# Patient Record
Sex: MALE | Race: WHITE | URBAN - METROPOLITAN AREA
[De-identification: names, ages, dates, MRNs, and addresses within clinical notes are randomized per-mention and may not be internally consistent; named-entity substitution may affect disease eponyms.]

---

## 2021-01-12 ENCOUNTER — OFFICE VISIT (OUTPATIENT)
Dept: URBAN - METROPOLITAN AREA CLINIC 98 | Facility: CLINIC | Age: 61
End: 2021-01-12
Payer: COMMERCIAL

## 2021-01-12 VITALS
BODY MASS INDEX: 30.16 KG/M2 | HEIGHT: 68 IN | TEMPERATURE: 97.4 F | SYSTOLIC BLOOD PRESSURE: 138 MMHG | DIASTOLIC BLOOD PRESSURE: 82 MMHG | HEART RATE: 53 BPM | WEIGHT: 199 LBS

## 2021-01-12 DIAGNOSIS — D72.829 LEUKOCYTOSIS: ICD-10-CM

## 2021-01-12 DIAGNOSIS — K21.9 GERD: ICD-10-CM

## 2021-01-12 DIAGNOSIS — D64.9 ANEMIA: ICD-10-CM

## 2021-01-12 DIAGNOSIS — K63.5 COLON POLYPS: ICD-10-CM

## 2021-01-12 PROCEDURE — G8417 CALC BMI ABV UP PARAM F/U: HCPCS | Performed by: INTERNAL MEDICINE

## 2021-01-12 PROCEDURE — 99214 OFFICE O/P EST MOD 30 MIN: CPT | Performed by: INTERNAL MEDICINE

## 2021-01-12 PROCEDURE — G8427 DOCREV CUR MEDS BY ELIG CLIN: HCPCS | Performed by: INTERNAL MEDICINE

## 2021-01-12 PROCEDURE — 3017F COLORECTAL CA SCREEN DOC REV: CPT | Performed by: INTERNAL MEDICINE

## 2021-01-12 PROCEDURE — G8482 FLU IMMUNIZE ORDER/ADMIN: HCPCS | Performed by: INTERNAL MEDICINE

## 2021-01-12 PROCEDURE — 1036F TOBACCO NON-USER: CPT | Performed by: INTERNAL MEDICINE

## 2021-01-12 PROCEDURE — G9903 PT SCRN TBCO ID AS NON USER: HCPCS | Performed by: INTERNAL MEDICINE

## 2021-01-12 RX ORDER — PANTOPRAZOLE SODIUM 40 MG/1
1 TABLET TABLET, DELAYED RELEASE ORAL
Status: ACTIVE | COMMUNITY

## 2021-01-12 RX ORDER — ROSUVASTATIN CALCIUM 40 MG/1
1 TABLET TABLET, FILM COATED ORAL
Status: ACTIVE | COMMUNITY

## 2021-01-12 RX ORDER — ALPRAZOLAM 0.5 MG/1
AS DIRECTED TABLET ORAL
Status: ACTIVE | COMMUNITY

## 2021-01-12 RX ORDER — BISOPROLOL FUMARATE 10 MG/1
1 TABLET TABLET, FILM COATED ORAL
Refills: 0 | Status: ACTIVE | COMMUNITY
Start: 1900-01-01

## 2021-01-12 RX ORDER — ESCITALOPRAM 20 MG/1
1 TABLET TABLET, FILM COATED ORAL
Refills: 0 | Status: ACTIVE | COMMUNITY
Start: 1900-01-01

## 2021-01-12 RX ORDER — VITAMIN E MIXED 400 UNIT
AS DIRECTED CAPSULE ORAL
Status: ACTIVE | COMMUNITY

## 2021-01-12 NOTE — HPI-TODAY'S VISIT:
Patient referred by Dr. Luke for evaluation of anemia.  A copy of this report will be sent to referring provider. Recent labs with PCP revealed mild macrocytic anemia.   MCV>100.  On review of my old labs, his ferritin was >1000  12/2020.  He does endorese h/o drinking 2 bottles of wine a day.  Does have h/o mild AST/ALT elevaiton with normal LFTs 12/2019.  MRI in the past with heterogenous liver.  Recent MRI done heterogenous liver without nodular contour to suggest fibrosis.   He saw Dr. Dale recently for evaluation, but records are not currently available (I do have Dr. Dale's clinic note that reports Plts >300).  No change in bowel habits.   We reviewed EGD/Colonoscopy reports from 2019 without significant pathology.  . PRIOR VISIT: Rectal pain reported last visit seems to have resolved with compound ointment Has not started fiber supplement No change in bowel habits Continues driking 2-3 glasses of wine a day PRIOR VISIT: At last visit, we discussed f/u hemorrhoids Reports he has tried to increase H20, but reports he is also drinking gatorade as well. Did not start fiber supplement. Bleeding has improved. Now reports significant rectal pain with bowel movements. 3-4 BM/day. No straining. Recent MRI reviewed. Heterogenous liver noted. Mild AST/ALT elevation on labs Still having 2-3 drinks/day  . PRIOR VISIT: H/o GERD, which is well controlled on pantoprazole He has a h/o bleeding hemorrhoids. Last colonoscopy 2019 with TA, rec repeat 3yrs. He sees blood at least 2x/week with bowel movements Reports he has 2-3 BM/day. BM usually soft. No significant straining. Does sit on the toilet for 5-8min for BMs Does not drink enough water during the day Does not get enough fiber in his diet Has been on Canasa in the past with mild benefit Has had hemorrhoid banding about 3 years ago Per report he had an enlarged liver on a recent ultrasound He thinks some of his liver enzymes were abnormal as well, but not sure what numbers are He drinks a "couple glasses" of wine every day. Denies other EtOH intake No known h/o liver disease  . PRIOR VISIT: GERD with stable symtpoms on Pantoprazole but was better on Dexilant. Canasa helps somewhat. Still bleeding. Wamnts some donnatal Reports abdominal; apin, hemorrhoids, bloating, change in bowel habits, diarrhea, indigestion, mucous in the stool and rectal bleeding. His last EGD 7/14/16 was without Vizcarra's or ulcers. Last E/C 7/11/14 was with RE and Adenoma. Right nipple pain and fullness. Has had night sweats. We discussed this for 45 minutes. 50% of time was spent coordinantg care.

## 2021-02-10 ENCOUNTER — ERX REFILL RESPONSE (OUTPATIENT)
Age: 61
End: 2021-02-10

## 2021-02-10 RX ORDER — PANTOPRAZOLE SODIUM 40 MG/1
TAKE ONE TABLET BY MOUTH ONE TIME DAILY TABLET, DELAYED RELEASE ORAL
Qty: 90 | Refills: 2

## 2021-02-15 ENCOUNTER — TELEPHONE ENCOUNTER (OUTPATIENT)
Dept: URBAN - METROPOLITAN AREA CLINIC 98 | Facility: CLINIC | Age: 61
End: 2021-02-15

## 2021-02-15 RX ORDER — PANTOPRAZOLE SODIUM 40 MG/1
1 TABLET TABLET, DELAYED RELEASE ORAL ONCE A DAY
Qty: 90 | Refills: 3

## 2021-04-13 ENCOUNTER — OFFICE VISIT (OUTPATIENT)
Dept: URBAN - METROPOLITAN AREA CLINIC 98 | Facility: CLINIC | Age: 61
End: 2021-04-13

## 2021-04-13 RX ORDER — ESCITALOPRAM 20 MG/1
1 TABLET TABLET, FILM COATED ORAL
Refills: 0 | Status: ACTIVE | COMMUNITY
Start: 1900-01-01

## 2021-04-13 RX ORDER — PANTOPRAZOLE SODIUM 40 MG/1
TAKE ONE TABLET BY MOUTH ONE TIME DAILY TABLET, DELAYED RELEASE ORAL
Qty: 90 | Refills: 2 | Status: ACTIVE | COMMUNITY

## 2021-04-13 RX ORDER — VITAMIN E MIXED 400 UNIT
AS DIRECTED CAPSULE ORAL
Status: ACTIVE | COMMUNITY

## 2021-04-13 RX ORDER — PANTOPRAZOLE SODIUM 40 MG/1
1 TABLET TABLET, DELAYED RELEASE ORAL ONCE A DAY
Qty: 90 | Refills: 3 | Status: ACTIVE | COMMUNITY

## 2021-04-13 RX ORDER — ALPRAZOLAM 0.5 MG/1
AS DIRECTED TABLET ORAL
Status: ACTIVE | COMMUNITY

## 2021-04-13 RX ORDER — ROSUVASTATIN CALCIUM 40 MG/1
1 TABLET TABLET, FILM COATED ORAL
Status: ACTIVE | COMMUNITY

## 2021-04-13 RX ORDER — BISOPROLOL FUMARATE 10 MG/1
1 TABLET TABLET, FILM COATED ORAL
Refills: 0 | Status: ACTIVE | COMMUNITY
Start: 1900-01-01

## 2021-11-08 ENCOUNTER — TELEPHONE ENCOUNTER (OUTPATIENT)
Dept: URBAN - METROPOLITAN AREA CLINIC 98 | Facility: CLINIC | Age: 61
End: 2021-11-08

## 2021-11-08 RX ORDER — PANTOPRAZOLE SODIUM 40 MG/1
TAKE ONE TABLET BY MOUTH ONE TIME DAILY TABLET, DELAYED RELEASE ORAL
Qty: 30 | Refills: 3

## 2022-01-25 ENCOUNTER — P2P PATIENT RECORD (OUTPATIENT)
Age: 62
End: 2022-01-25

## 2022-06-08 ENCOUNTER — OFFICE VISIT (OUTPATIENT)
Dept: URBAN - METROPOLITAN AREA CLINIC 105 | Facility: CLINIC | Age: 62
End: 2022-06-08

## 2022-06-08 RX ORDER — BISOPROLOL FUMARATE 10 MG/1
1 TABLET TABLET, FILM COATED ORAL
Refills: 0 | Status: ACTIVE | COMMUNITY
Start: 1900-01-01

## 2022-06-08 RX ORDER — VITAMIN E MIXED 400 UNIT
AS DIRECTED CAPSULE ORAL
Status: ACTIVE | COMMUNITY

## 2022-06-08 RX ORDER — ESCITALOPRAM 20 MG/1
1 TABLET TABLET, FILM COATED ORAL
Refills: 0 | Status: ACTIVE | COMMUNITY
Start: 1900-01-01

## 2022-06-08 RX ORDER — PANTOPRAZOLE SODIUM 40 MG/1
TAKE ONE TABLET BY MOUTH ONE TIME DAILY TABLET, DELAYED RELEASE ORAL
Qty: 30 | Refills: 3 | Status: ACTIVE | COMMUNITY

## 2022-06-08 RX ORDER — PANTOPRAZOLE SODIUM 40 MG/1
1 TABLET TABLET, DELAYED RELEASE ORAL ONCE A DAY
Qty: 90 | Refills: 3 | Status: ACTIVE | COMMUNITY

## 2022-06-08 RX ORDER — ROSUVASTATIN CALCIUM 40 MG/1
1 TABLET TABLET, FILM COATED ORAL
Status: ACTIVE | COMMUNITY

## 2022-06-08 RX ORDER — ALPRAZOLAM 0.5 MG/1
AS DIRECTED TABLET ORAL
Status: ACTIVE | COMMUNITY

## 2022-11-04 ENCOUNTER — P2P PATIENT RECORD (OUTPATIENT)
Age: 62
End: 2022-11-04

## 2023-01-17 ENCOUNTER — OFFICE VISIT (OUTPATIENT)
Dept: URBAN - METROPOLITAN AREA CLINIC 98 | Facility: CLINIC | Age: 63
End: 2023-01-17

## 2023-01-26 ENCOUNTER — OFFICE VISIT (OUTPATIENT)
Dept: URBAN - METROPOLITAN AREA CLINIC 50 | Facility: CLINIC | Age: 63
End: 2023-01-26

## 2023-02-08 ENCOUNTER — TELEPHONE ENCOUNTER (OUTPATIENT)
Dept: URBAN - METROPOLITAN AREA CLINIC 98 | Facility: CLINIC | Age: 63
End: 2023-02-08

## 2023-02-09 ENCOUNTER — WEB ENCOUNTER (OUTPATIENT)
Dept: URBAN - METROPOLITAN AREA CLINIC 98 | Facility: CLINIC | Age: 63
End: 2023-02-09

## 2023-02-10 ENCOUNTER — LAB OUTSIDE AN ENCOUNTER (OUTPATIENT)
Dept: URBAN - METROPOLITAN AREA CLINIC 98 | Facility: CLINIC | Age: 63
End: 2023-02-10

## 2023-02-10 ENCOUNTER — OFFICE VISIT (OUTPATIENT)
Dept: URBAN - METROPOLITAN AREA CLINIC 98 | Facility: CLINIC | Age: 63
End: 2023-02-10
Payer: COMMERCIAL

## 2023-02-10 VITALS
BODY MASS INDEX: 30.89 KG/M2 | WEIGHT: 203.8 LBS | HEIGHT: 68 IN | TEMPERATURE: 97.2 F | DIASTOLIC BLOOD PRESSURE: 60 MMHG | HEART RATE: 60 BPM | SYSTOLIC BLOOD PRESSURE: 105 MMHG

## 2023-02-10 DIAGNOSIS — R13.19 ESOPHAGEAL DYSPHAGIA: ICD-10-CM

## 2023-02-10 DIAGNOSIS — D64.9 ANEMIA: ICD-10-CM

## 2023-02-10 DIAGNOSIS — K63.5 COLON POLYPS: ICD-10-CM

## 2023-02-10 DIAGNOSIS — K21.9 GERD: ICD-10-CM

## 2023-02-10 DIAGNOSIS — R16.0 HEPATOMEGALY: ICD-10-CM

## 2023-02-10 PROBLEM — 390943009 SERUM FERRITIN HIGH: Status: ACTIVE | Noted: 2021-01-12

## 2023-02-10 PROBLEM — 166318006 BLOOD CHEMISTRY ABNORMAL: Status: ACTIVE | Noted: 2021-01-12

## 2023-02-10 PROBLEM — 29738008 PROTEINURIA: Status: ACTIVE | Noted: 2021-01-12

## 2023-02-10 PROBLEM — 197321007 STEATOSIS OF LIVER: Status: ACTIVE | Noted: 2021-01-12

## 2023-02-10 PROBLEM — 105502003: Status: ACTIVE | Noted: 2023-02-10

## 2023-02-10 PROBLEM — 111583006 LEUKOCYTOSIS: Status: ACTIVE | Noted: 2021-01-12

## 2023-02-10 PROBLEM — 236435004: Status: ACTIVE | Noted: 2023-02-10

## 2023-02-10 PROBLEM — 15167005 ETHANOL ABUSE: Status: ACTIVE | Noted: 2021-01-12

## 2023-02-10 PROCEDURE — 99214 OFFICE O/P EST MOD 30 MIN: CPT | Performed by: INTERNAL MEDICINE

## 2023-02-10 RX ORDER — BISOPROLOL FUMARATE 10 MG/1
1 TABLET TABLET, FILM COATED ORAL
Refills: 0 | Status: ACTIVE | COMMUNITY
Start: 1900-01-01

## 2023-02-10 RX ORDER — ROSUVASTATIN CALCIUM 40 MG/1
1 TABLET TABLET, FILM COATED ORAL
Status: ACTIVE | COMMUNITY

## 2023-02-10 RX ORDER — VITAMIN E MIXED 400 UNIT
AS DIRECTED CAPSULE ORAL
Status: ACTIVE | COMMUNITY

## 2023-02-10 RX ORDER — ALPRAZOLAM 0.5 MG/1
AS DIRECTED TABLET ORAL
Status: ON HOLD | COMMUNITY

## 2023-02-10 RX ORDER — PANTOPRAZOLE SODIUM 40 MG/1
TAKE ONE TABLET BY MOUTH ONE TIME DAILY TABLET, DELAYED RELEASE ORAL ONCE A DAY
Qty: 90 | Refills: 3

## 2023-02-10 RX ORDER — POLYETHYLENE GLYCOL-3350 AND ELECTROLYTES WITH FLAVOR PACK 240; 5.84; 2.98; 6.72; 22.72 G/278.26G; G/278.26G; G/278.26G; G/278.26G; G/278.26G
AS DIRECTED POWDER, FOR SOLUTION ORAL ONCE
Qty: 1 KIT | Refills: 0 | OUTPATIENT
Start: 2023-02-10 | End: 2023-02-11

## 2023-02-10 RX ORDER — PANTOPRAZOLE SODIUM 40 MG/1
TAKE ONE TABLET BY MOUTH ONE TIME DAILY TABLET, DELAYED RELEASE ORAL
Qty: 30 | Refills: 3 | Status: ON HOLD | COMMUNITY

## 2023-02-10 RX ORDER — PANTOPRAZOLE SODIUM 40 MG/1
1 TABLET TABLET, DELAYED RELEASE ORAL ONCE A DAY
Qty: 90 | Refills: 3 | Status: ON HOLD | COMMUNITY

## 2023-02-10 RX ORDER — ESCITALOPRAM 20 MG/1
1 TABLET TABLET, FILM COATED ORAL
Refills: 0 | Status: ACTIVE | COMMUNITY
Start: 1900-01-01

## 2023-02-10 NOTE — HPI-TODAY'S VISIT:
f/u visit Recently hospitalized at [facility] for initiation of dialysis In clinic today for months of epigastric abd discomfort after eating He will have sharp epigastric pain after eating or drinking.  Intermittent symptoms Will feel like food gets stuck from time to time     HOSPITAL CONSULT 1/30/23 ROCKY GROVES is a 62 y.o. male with a PMH of CKD, anxiety, depression, and alcohol use who presented on 1/29 with general malaise, poor oral intake, and reported fall at home, found to have electrolyte abnormalities, JO, elevated liver enzymes, and rhabdomyolysis, admitted to the ICU for further management. GI is consulted on 1/30 for dark stools.  . PRIOR VISIT: Patient referred by Dr. Luke for evaluation of anemia.  A copy of this report will be sent to referring provider. Recent labs with PCP revealed mild macrocytic anemia.   MCV>100.  On review of my old labs, his ferritin was >1000  12/2020.  He does endorese h/o drinking 2 bottles of wine a day.  Does have h/o mild AST/ALT elevaiton with normal LFTs 12/2019.  MRI in the past with heterogenous liver.  Recent MRI done heterogenous liver without nodular contour to suggest fibrosis.   He saw Dr. Dale recently for evaluation, but records are not currently available (I do have Dr. Dale's clinic note that reports Plts >300).  No change in bowel habits.   We reviewed EGD/Colonoscopy reports from 2019 without significant pathology.  . PRIOR VISIT: Rectal pain reported last visit seems to have resolved with compound ointment Has not started fiber supplement No change in bowel habits Continues driking 2-3 glasses of wine a day PRIOR VISIT: At last visit, we discussed f/u hemorrhoids Reports he has tried to increase H20, but reports he is also drinking gatorade as well. Did not start fiber supplement. Bleeding has improved. Now reports significant rectal pain with bowel movements. 3-4 BM/day. No straining. Recent MRI reviewed. Heterogenous liver noted. Mild AST/ALT elevation on labs Still having 2-3 drinks/day  . PRIOR VISIT: H/o GERD, which is well controlled on pantoprazole He has a h/o bleeding hemorrhoids. Last colonoscopy 2019 with TA, rec repeat 3yrs. He sees blood at least 2x/week with bowel movements Reports he has 2-3 BM/day. BM usually soft. No significant straining. Does sit on the toilet for 5-8min for BMs Does not drink enough water during the day Does not get enough fiber in his diet Has been on Canasa in the past with mild benefit Has had hemorrhoid banding about 3 years ago Per report he had an enlarged liver on a recent ultrasound He thinks some of his liver enzymes were abnormal as well, but not sure what numbers are He drinks a "couple glasses" of wine every day. Denies other EtOH intake No known h/o liver disease  . PRIOR VISIT: GERD with stable symtpoms on Pantoprazole but was better on Dexilant. Canasa helps somewhat. Still bleeding. Wamnts some donnatal Reports abdominal; apin, hemorrhoids, bloating, change in bowel habits, diarrhea, indigestion, mucous in the stool and rectal bleeding. His last EGD 7/14/16 was without Vizcarra's or ulcers. Last E/C 7/11/14 was with RE and Adenoma. Right nipple pain and fullness. Has had night sweats. We discussed this for 45 minutes. 50% of time was spent coordinantg care.

## 2023-02-16 ENCOUNTER — OFFICE VISIT (OUTPATIENT)
Dept: URBAN - METROPOLITAN AREA MEDICAL CENTER 39 | Facility: MEDICAL CENTER | Age: 63
End: 2023-02-16

## 2023-02-16 ENCOUNTER — CLAIMS CREATED FROM THE CLAIM WINDOW (OUTPATIENT)
Dept: URBAN - METROPOLITAN AREA MEDICAL CENTER 39 | Facility: MEDICAL CENTER | Age: 63
End: 2023-02-16
Payer: COMMERCIAL

## 2023-02-16 DIAGNOSIS — K22.2 ACQUIRED ESOPHAGEAL RING: ICD-10-CM

## 2023-02-16 DIAGNOSIS — K31.89 ACQUIRED DEFORMITY OF DUODENUM: ICD-10-CM

## 2023-02-16 DIAGNOSIS — D12.4 ADENOMA OF DESCENDING COLON: ICD-10-CM

## 2023-02-16 DIAGNOSIS — Z86.010 ADENOMAS PERSONAL HISTORY OF COLONIC POLYPS: ICD-10-CM

## 2023-02-16 PROCEDURE — 43239 EGD BIOPSY SINGLE/MULTIPLE: CPT | Performed by: INTERNAL MEDICINE

## 2023-02-16 PROCEDURE — 45385 COLONOSCOPY W/LESION REMOVAL: CPT | Performed by: INTERNAL MEDICINE

## 2023-02-16 PROCEDURE — 43249 ESOPH EGD DILATION <30 MM: CPT | Performed by: INTERNAL MEDICINE

## 2023-02-16 RX ORDER — VITAMIN E MIXED 400 UNIT
AS DIRECTED CAPSULE ORAL
Status: ACTIVE | COMMUNITY

## 2023-02-16 RX ORDER — ROSUVASTATIN CALCIUM 40 MG/1
1 TABLET TABLET, FILM COATED ORAL
Status: ACTIVE | COMMUNITY

## 2023-02-16 RX ORDER — PANTOPRAZOLE SODIUM 40 MG/1
1 TABLET TABLET, DELAYED RELEASE ORAL ONCE A DAY
Qty: 90 | Refills: 3 | Status: ON HOLD | COMMUNITY

## 2023-02-16 RX ORDER — BISOPROLOL FUMARATE 10 MG/1
1 TABLET TABLET, FILM COATED ORAL
Refills: 0 | Status: ACTIVE | COMMUNITY
Start: 1900-01-01

## 2023-02-16 RX ORDER — PANTOPRAZOLE SODIUM 40 MG/1
TAKE ONE TABLET BY MOUTH ONE TIME DAILY TABLET, DELAYED RELEASE ORAL ONCE A DAY
Qty: 90 | Refills: 3 | Status: ACTIVE | COMMUNITY

## 2023-02-16 RX ORDER — ESCITALOPRAM 20 MG/1
1 TABLET TABLET, FILM COATED ORAL
Refills: 0 | Status: ACTIVE | COMMUNITY
Start: 1900-01-01

## 2023-02-16 RX ORDER — ALPRAZOLAM 0.5 MG/1
AS DIRECTED TABLET ORAL
Status: ON HOLD | COMMUNITY

## 2023-02-17 ENCOUNTER — OUT OF OFFICE VISIT (OUTPATIENT)
Dept: URBAN - METROPOLITAN AREA MEDICAL CENTER 39 | Facility: MEDICAL CENTER | Age: 63
End: 2023-02-17
Payer: COMMERCIAL

## 2023-02-17 DIAGNOSIS — K62.5 BLOOD PER RECTUM: ICD-10-CM

## 2023-02-17 DIAGNOSIS — Z86.010 COLON POLYP HISTORY: ICD-10-CM

## 2023-02-17 DIAGNOSIS — K91.840 POSTOPERATIVE HEMORRHAGE INVOLVING DIGESTIVE SYSTEM FOLLOWING DIGESTIVE SYSTEM PROCEDURE: ICD-10-CM

## 2023-02-17 DIAGNOSIS — N18.30 CHRONIC KIDNEY DISEASE, STAGE 3: ICD-10-CM

## 2023-02-17 PROCEDURE — G8427 DOCREV CUR MEDS BY ELIG CLIN: HCPCS | Performed by: INTERNAL MEDICINE

## 2023-02-17 PROCEDURE — 99255 IP/OBS CONSLTJ NEW/EST HI 80: CPT | Performed by: INTERNAL MEDICINE

## 2023-02-18 ENCOUNTER — OUT OF OFFICE VISIT (OUTPATIENT)
Dept: URBAN - METROPOLITAN AREA MEDICAL CENTER 39 | Facility: MEDICAL CENTER | Age: 63
End: 2023-02-18
Payer: COMMERCIAL

## 2023-02-18 DIAGNOSIS — K63.3 COLON ULCER: ICD-10-CM

## 2023-02-18 DIAGNOSIS — D64.89 ANEMIA DUE TO OTHER CAUSE: ICD-10-CM

## 2023-02-18 DIAGNOSIS — D12.2 ADENOMA OF ASCENDING COLON: ICD-10-CM

## 2023-02-18 PROCEDURE — 99233 SBSQ HOSP IP/OBS HIGH 50: CPT | Performed by: INTERNAL MEDICINE

## 2023-02-18 PROCEDURE — 45380 COLONOSCOPY AND BIOPSY: CPT | Performed by: INTERNAL MEDICINE

## 2023-02-20 PROBLEM — 80515008 HEPATOMEGALY: Status: ACTIVE | Noted: 2021-01-12

## 2023-02-20 PROBLEM — 271737000 ANEMIA: Status: ACTIVE | Noted: 2021-01-12

## 2023-02-20 PROBLEM — 235595009 GASTROESOPHAGEAL REFLUX DISEASE: Status: ACTIVE | Noted: 2023-02-10

## 2023-03-03 ENCOUNTER — TELEPHONE ENCOUNTER (OUTPATIENT)
Dept: URBAN - METROPOLITAN AREA CLINIC 98 | Facility: CLINIC | Age: 63
End: 2023-03-03

## 2023-03-07 ENCOUNTER — LAB OUTSIDE AN ENCOUNTER (OUTPATIENT)
Dept: URBAN - METROPOLITAN AREA CLINIC 98 | Facility: CLINIC | Age: 63
End: 2023-03-07

## 2023-03-08 ENCOUNTER — TELEPHONE ENCOUNTER (OUTPATIENT)
Dept: URBAN - METROPOLITAN AREA CLINIC 98 | Facility: CLINIC | Age: 63
End: 2023-03-08

## 2023-03-09 ENCOUNTER — CLAIMS CREATED FROM THE CLAIM WINDOW (OUTPATIENT)
Dept: URBAN - METROPOLITAN AREA MEDICAL CENTER 39 | Facility: MEDICAL CENTER | Age: 63
End: 2023-03-09

## 2023-03-09 ENCOUNTER — OUT OF OFFICE VISIT (OUTPATIENT)
Dept: URBAN - METROPOLITAN AREA MEDICAL CENTER 39 | Facility: MEDICAL CENTER | Age: 63
End: 2023-03-09
Payer: COMMERCIAL

## 2023-03-09 ENCOUNTER — TELEPHONE ENCOUNTER (OUTPATIENT)
Dept: URBAN - METROPOLITAN AREA CLINIC 98 | Facility: CLINIC | Age: 63
End: 2023-03-09

## 2023-03-09 DIAGNOSIS — K22.89 DILATATION OF ESOPHAGUS: ICD-10-CM

## 2023-03-09 DIAGNOSIS — R13.19 CERVICAL DYSPHAGIA: ICD-10-CM

## 2023-03-09 DIAGNOSIS — R93.3 ABN FINDINGS-GI TRACT: ICD-10-CM

## 2023-03-09 DIAGNOSIS — Z86.010 ADENOMAS PERSONAL HISTORY OF COLONIC POLYPS: ICD-10-CM

## 2023-03-09 DIAGNOSIS — K31.89 ACQUIRED DEFORMITY OF DUODENUM: ICD-10-CM

## 2023-03-09 PROCEDURE — 43239 EGD BIOPSY SINGLE/MULTIPLE: CPT | Performed by: INTERNAL MEDICINE

## 2023-03-09 PROCEDURE — 99283 EMERGENCY DEPT VISIT LOW MDM: CPT | Performed by: INTERNAL MEDICINE

## 2023-03-10 ENCOUNTER — TELEPHONE ENCOUNTER (OUTPATIENT)
Dept: URBAN - METROPOLITAN AREA CLINIC 98 | Facility: CLINIC | Age: 63
End: 2023-03-10

## 2023-03-12 ENCOUNTER — P2P PATIENT RECORD (OUTPATIENT)
Age: 63
End: 2023-03-12

## 2023-03-21 ENCOUNTER — WEB ENCOUNTER (OUTPATIENT)
Dept: URBAN - METROPOLITAN AREA CLINIC 98 | Facility: CLINIC | Age: 63
End: 2023-03-21

## 2023-03-21 ENCOUNTER — OFFICE VISIT (OUTPATIENT)
Dept: URBAN - METROPOLITAN AREA CLINIC 98 | Facility: CLINIC | Age: 63
End: 2023-03-21
Payer: COMMERCIAL

## 2023-03-21 VITALS
HEIGHT: 68 IN | DIASTOLIC BLOOD PRESSURE: 60 MMHG | WEIGHT: 189.8 LBS | HEART RATE: 97 BPM | SYSTOLIC BLOOD PRESSURE: 102 MMHG | BODY MASS INDEX: 28.76 KG/M2 | TEMPERATURE: 97 F

## 2023-03-21 DIAGNOSIS — D64.9 ANEMIA: ICD-10-CM

## 2023-03-21 DIAGNOSIS — K63.5 COLON POLYPS: ICD-10-CM

## 2023-03-21 DIAGNOSIS — R13.19 ESOPHAGEAL DYSPHAGIA: ICD-10-CM

## 2023-03-21 DIAGNOSIS — K21.9 GERD: ICD-10-CM

## 2023-03-21 PROBLEM — 40890009: Status: ACTIVE | Noted: 2023-03-21

## 2023-03-21 PROCEDURE — 99214 OFFICE O/P EST MOD 30 MIN: CPT | Performed by: INTERNAL MEDICINE

## 2023-03-21 RX ORDER — WHEAT DEXTRIN 3 G/3.5 G
AS DIRECTED POWDER (GRAM) ORAL
OUTPATIENT

## 2023-03-21 RX ORDER — FAMOTIDINE 40 MG/1
1 TABLET AT BEDTIME TABLET, FILM COATED ORAL ONCE A DAY
Qty: 30 | OUTPATIENT
Start: 2023-03-21

## 2023-03-21 RX ORDER — BISOPROLOL FUMARATE 10 MG/1
1 TABLET TABLET, FILM COATED ORAL
Refills: 0 | Status: ACTIVE | COMMUNITY
Start: 1900-01-01

## 2023-03-21 RX ORDER — ROSUVASTATIN CALCIUM 40 MG/1
1 TABLET TABLET, FILM COATED ORAL
Status: ACTIVE | COMMUNITY

## 2023-03-21 RX ORDER — VITAMIN E MIXED 400 UNIT
AS DIRECTED CAPSULE ORAL
Status: ACTIVE | COMMUNITY

## 2023-03-21 RX ORDER — PANTOPRAZOLE SODIUM 40 MG/1
TAKE ONE TABLET BY MOUTH ONE TIME DAILY TABLET, DELAYED RELEASE ORAL ONCE A DAY
Qty: 90 | Refills: 3 | Status: ACTIVE | COMMUNITY

## 2023-03-21 RX ORDER — ALPRAZOLAM 0.5 MG/1
AS DIRECTED TABLET ORAL
Status: ON HOLD | COMMUNITY

## 2023-03-21 RX ORDER — PANTOPRAZOLE SODIUM 40 MG/1
1 TABLET TABLET, DELAYED RELEASE ORAL ONCE A DAY
Qty: 90 | Refills: 3 | Status: ON HOLD | COMMUNITY

## 2023-03-21 RX ORDER — ESCITALOPRAM 20 MG/1
1 TABLET TABLET, FILM COATED ORAL
Refills: 0 | Status: ACTIVE | COMMUNITY
Start: 1900-01-01

## 2023-03-21 NOTE — HPI-TODAY'S VISIT:
f/u visit Recently at [facility] 3/9/23 with food impaciton, which occurred after dilation to 12mm 2/2023 Since then has done well, but occ sticking Needs repeat EGD for dilation  . PRIOR VISIT: Recently hospitalized at [facility] for initiation of dialysis In clinic today for months of epigastric abd discomfort after eating He will have sharp epigastric pain after eating or drinking.  Intermittent symptoms Will feel like food gets stuck from time to time     HOSPITAL CONSULT 1/30/23 ROCKY GROVES is a 62 y.o. male with a PMH of CKD, anxiety, depression, and alcohol use who presented on 1/29 with general malaise, poor oral intake, and reported fall at home, found to have electrolyte abnormalities, JO, elevated liver enzymes, and rhabdomyolysis, admitted to the ICU for further management. GI is consulted on 1/30 for dark stools.  . PRIOR VISIT: Patient referred by Dr. Luke for evaluation of anemia.  A copy of this report will be sent to referring provider. Recent labs with PCP revealed mild macrocytic anemia.   MCV>100.  On review of my old labs, his ferritin was >1000  12/2020.  He does endorese h/o drinking 2 bottles of wine a day.  Does have h/o mild AST/ALT elevaiton with normal LFTs 12/2019.  MRI in the past with heterogenous liver.  Recent MRI done heterogenous liver without nodular contour to suggest fibrosis.   He saw Dr. Dale recently for evaluation, but records are not currently available (I do have Dr. Dale's clinic note that reports Plts >300).  No change in bowel habits.   We reviewed EGD/Colonoscopy reports from 2019 without significant pathology.  . PRIOR VISIT: Rectal pain reported last visit seems to have resolved with compound ointment Has not started fiber supplement No change in bowel habits Continues driking 2-3 glasses of wine a day PRIOR VISIT: At last visit, we discussed f/u hemorrhoids Reports he has tried to increase H20, but reports he is also drinking gatorade as well. Did not start fiber supplement. Bleeding has improved. Now reports significant rectal pain with bowel movements. 3-4 BM/day. No straining. Recent MRI reviewed. Heterogenous liver noted. Mild AST/ALT elevation on labs Still having 2-3 drinks/day  . PRIOR VISIT: H/o GERD, which is well controlled on pantoprazole He has a h/o bleeding hemorrhoids. Last colonoscopy 2019 with TA, rec repeat 3yrs. He sees blood at least 2x/week with bowel movements Reports he has 2-3 BM/day. BM usually soft. No significant straining. Does sit on the toilet for 5-8min for BMs Does not drink enough water during the day Does not get enough fiber in his diet Has been on Canasa in the past with mild benefit Has had hemorrhoid banding about 3 years ago Per report he had an enlarged liver on a recent ultrasound He thinks some of his liver enzymes were abnormal as well, but not sure what numbers are He drinks a "couple glasses" of wine every day. Denies other EtOH intake No known h/o liver disease  . PRIOR VISIT: GERD with stable symtpoms on Pantoprazole but was better on Dexilant. Canasa helps somewhat. Still bleeding. Wamnts some donnatal Reports abdominal; apin, hemorrhoids, bloating, change in bowel habits, diarrhea, indigestion, mucous in the stool and rectal bleeding. His last EGD 7/14/16 was without Vizcarra's or ulcers. Last E/C 7/11/14 was with RE and Adenoma. Right nipple pain and fullness. Has had night sweats. We discussed this for 45 minutes. 50% of time was spent coordinantg care.

## 2023-04-25 ENCOUNTER — TELEPHONE ENCOUNTER (OUTPATIENT)
Dept: URBAN - METROPOLITAN AREA CLINIC 98 | Facility: CLINIC | Age: 63
End: 2023-04-25

## 2023-04-27 ENCOUNTER — P2P PATIENT RECORD (OUTPATIENT)
Age: 63
End: 2023-04-27

## 2023-04-27 ENCOUNTER — OFFICE VISIT (OUTPATIENT)
Dept: URBAN - METROPOLITAN AREA MEDICAL CENTER 39 | Facility: MEDICAL CENTER | Age: 63
End: 2023-04-27
Payer: COMMERCIAL

## 2023-04-27 ENCOUNTER — LAB OUTSIDE AN ENCOUNTER (OUTPATIENT)
Dept: URBAN - METROPOLITAN AREA MEDICAL CENTER 39 | Facility: MEDICAL CENTER | Age: 63
End: 2023-04-27

## 2023-04-27 DIAGNOSIS — Z09 CARDIOLOGY FOLLOW-UP ENCOUNTER: ICD-10-CM

## 2023-04-27 DIAGNOSIS — K31.89 ACQUIRED DEFORMITY OF DUODENUM: ICD-10-CM

## 2023-04-27 DIAGNOSIS — Z86.010 ADENOMAS PERSONAL HISTORY OF COLONIC POLYPS: ICD-10-CM

## 2023-04-27 DIAGNOSIS — D12.0 ADENOMA OF CECUM: ICD-10-CM

## 2023-04-27 DIAGNOSIS — K22.2 ACQUIRED ESOPHAGEAL RING: ICD-10-CM

## 2023-04-27 DIAGNOSIS — D12.4 ADENOMA OF DESCENDING COLON: ICD-10-CM

## 2023-04-27 DIAGNOSIS — D12.2 ADENOMA OF ASCENDING COLON: ICD-10-CM

## 2023-04-27 PROCEDURE — 45385 COLONOSCOPY W/LESION REMOVAL: CPT | Performed by: INTERNAL MEDICINE

## 2023-04-27 PROCEDURE — 45380 COLONOSCOPY AND BIOPSY: CPT | Performed by: INTERNAL MEDICINE

## 2023-04-27 PROCEDURE — 43249 ESOPH EGD DILATION <30 MM: CPT | Performed by: INTERNAL MEDICINE

## 2023-04-27 RX ORDER — ROSUVASTATIN CALCIUM 40 MG/1
1 TABLET TABLET, FILM COATED ORAL
Status: ACTIVE | COMMUNITY

## 2023-04-27 RX ORDER — WHEAT DEXTRIN 3 G/3.5 G
AS DIRECTED POWDER (GRAM) ORAL
OUTPATIENT

## 2023-04-27 RX ORDER — FAMOTIDINE 40 MG/1
1 TABLET AT BEDTIME TABLET, FILM COATED ORAL ONCE A DAY
Qty: 30 | OUTPATIENT

## 2023-04-27 RX ORDER — BISOPROLOL FUMARATE 10 MG/1
1 TABLET TABLET, FILM COATED ORAL
Refills: 0 | Status: ACTIVE | COMMUNITY
Start: 1900-01-01

## 2023-04-27 RX ORDER — PANTOPRAZOLE SODIUM 40 MG/1
TAKE ONE TABLET BY MOUTH ONE TIME DAILY TABLET, DELAYED RELEASE ORAL ONCE A DAY
Qty: 90 | Refills: 3 | Status: ACTIVE | COMMUNITY

## 2023-04-27 RX ORDER — VITAMIN E MIXED 400 UNIT
AS DIRECTED CAPSULE ORAL
Status: ACTIVE | COMMUNITY

## 2023-04-27 RX ORDER — ALPRAZOLAM 0.5 MG/1
AS DIRECTED TABLET ORAL
Status: ON HOLD | COMMUNITY

## 2023-04-27 RX ORDER — WHEAT DEXTRIN 3 G/3.5 G
AS DIRECTED POWDER (GRAM) ORAL
Status: ACTIVE | COMMUNITY

## 2023-04-27 RX ORDER — ESCITALOPRAM 20 MG/1
1 TABLET TABLET, FILM COATED ORAL
Refills: 0 | Status: ACTIVE | COMMUNITY
Start: 1900-01-01

## 2023-04-27 RX ORDER — FAMOTIDINE 40 MG/1
1 TABLET AT BEDTIME TABLET, FILM COATED ORAL ONCE A DAY
Qty: 30 | Status: ACTIVE | COMMUNITY
Start: 2023-03-21

## 2023-04-27 RX ORDER — PANTOPRAZOLE SODIUM 40 MG/1
1 TABLET TABLET, DELAYED RELEASE ORAL ONCE A DAY
Qty: 90 | Refills: 3 | Status: ON HOLD | COMMUNITY

## 2023-05-01 ENCOUNTER — TELEPHONE ENCOUNTER (OUTPATIENT)
Dept: URBAN - METROPOLITAN AREA CLINIC 98 | Facility: CLINIC | Age: 63
End: 2023-05-01

## 2023-05-26 ENCOUNTER — OFFICE VISIT (OUTPATIENT)
Dept: URBAN - METROPOLITAN AREA MEDICAL CENTER 28 | Facility: MEDICAL CENTER | Age: 63
End: 2023-05-26
Payer: COMMERCIAL

## 2023-05-26 DIAGNOSIS — R13.19 CERVICAL DYSPHAGIA: ICD-10-CM

## 2023-05-26 DIAGNOSIS — K22.2 ACQUIRED ESOPHAGEAL RING: ICD-10-CM

## 2023-05-26 PROCEDURE — 43249 ESOPH EGD DILATION <30 MM: CPT | Performed by: INTERNAL MEDICINE

## 2023-05-26 RX ORDER — ESCITALOPRAM 20 MG/1
1 TABLET TABLET, FILM COATED ORAL
Refills: 0 | Status: ACTIVE | COMMUNITY
Start: 1900-01-01

## 2023-05-26 RX ORDER — VITAMIN E MIXED 400 UNIT
AS DIRECTED CAPSULE ORAL
Status: ACTIVE | COMMUNITY

## 2023-05-26 RX ORDER — BISOPROLOL FUMARATE 10 MG/1
1 TABLET TABLET, FILM COATED ORAL
Refills: 0 | Status: ACTIVE | COMMUNITY
Start: 1900-01-01

## 2023-05-26 RX ORDER — ALPRAZOLAM 0.5 MG/1
AS DIRECTED TABLET ORAL
Status: ON HOLD | COMMUNITY

## 2023-05-26 RX ORDER — PANTOPRAZOLE SODIUM 40 MG/1
TAKE ONE TABLET BY MOUTH ONE TIME DAILY TABLET, DELAYED RELEASE ORAL ONCE A DAY
Qty: 90 | Refills: 3 | Status: ACTIVE | COMMUNITY

## 2023-05-26 RX ORDER — ROSUVASTATIN CALCIUM 40 MG/1
1 TABLET TABLET, FILM COATED ORAL
Status: ACTIVE | COMMUNITY

## 2023-05-26 RX ORDER — WHEAT DEXTRIN 3 G/3.5 G
AS DIRECTED POWDER (GRAM) ORAL
Status: ACTIVE | COMMUNITY

## 2023-05-26 RX ORDER — FAMOTIDINE 40 MG/1
1 TABLET AT BEDTIME TABLET, FILM COATED ORAL ONCE A DAY
Qty: 30 | Status: ACTIVE | COMMUNITY

## 2023-05-26 RX ORDER — PANTOPRAZOLE SODIUM 40 MG/1
1 TABLET TABLET, DELAYED RELEASE ORAL ONCE A DAY
Qty: 90 | Refills: 3 | Status: ON HOLD | COMMUNITY

## 2023-06-26 ENCOUNTER — TELEPHONE ENCOUNTER (OUTPATIENT)
Dept: URBAN - METROPOLITAN AREA CLINIC 98 | Facility: CLINIC | Age: 63
End: 2023-06-26

## 2023-06-29 ENCOUNTER — TELEPHONE ENCOUNTER (OUTPATIENT)
Dept: URBAN - METROPOLITAN AREA CLINIC 96 | Facility: CLINIC | Age: 63
End: 2023-06-29

## 2023-07-21 ENCOUNTER — P2P PATIENT RECORD (OUTPATIENT)
Age: 63
End: 2023-07-21

## 2023-10-24 ENCOUNTER — LAB OUTSIDE AN ENCOUNTER (OUTPATIENT)
Dept: URBAN - METROPOLITAN AREA CLINIC 98 | Facility: CLINIC | Age: 63
End: 2023-10-24

## 2023-10-24 ENCOUNTER — DASHBOARD ENCOUNTERS (OUTPATIENT)
Age: 63
End: 2023-10-24

## 2023-10-24 ENCOUNTER — OFFICE VISIT (OUTPATIENT)
Dept: URBAN - METROPOLITAN AREA CLINIC 98 | Facility: CLINIC | Age: 63
End: 2023-10-24
Payer: COMMERCIAL

## 2023-10-24 VITALS
TEMPERATURE: 98.4 F | SYSTOLIC BLOOD PRESSURE: 156 MMHG | HEART RATE: 77 BPM | DIASTOLIC BLOOD PRESSURE: 80 MMHG | WEIGHT: 183 LBS | HEIGHT: 68 IN | BODY MASS INDEX: 27.74 KG/M2

## 2023-10-24 DIAGNOSIS — K63.5 COLON POLYPS: ICD-10-CM

## 2023-10-24 DIAGNOSIS — K21.9 GERD: ICD-10-CM

## 2023-10-24 DIAGNOSIS — R13.19 ESOPHAGEAL DYSPHAGIA: ICD-10-CM

## 2023-10-24 DIAGNOSIS — D64.89 ANEMIA DUE TO OTHER CAUSE: ICD-10-CM

## 2023-10-24 PROCEDURE — 99214 OFFICE O/P EST MOD 30 MIN: CPT | Performed by: INTERNAL MEDICINE

## 2023-10-24 RX ORDER — PANTOPRAZOLE SODIUM 40 MG/1
1 TABLET TABLET, DELAYED RELEASE ORAL ONCE A DAY
Qty: 90 | Refills: 3 | Status: ON HOLD | COMMUNITY

## 2023-10-24 RX ORDER — ROSUVASTATIN CALCIUM 40 MG/1
1 TABLET TABLET, FILM COATED ORAL
Status: ACTIVE | COMMUNITY

## 2023-10-24 RX ORDER — ESCITALOPRAM 20 MG/1
1 TABLET TABLET, FILM COATED ORAL
Refills: 0 | Status: ACTIVE | COMMUNITY
Start: 1900-01-01

## 2023-10-24 RX ORDER — FAMOTIDINE 40 MG/1
1 TABLET AT BEDTIME TABLET, FILM COATED ORAL ONCE A DAY
Qty: 90 | Refills: 3 | OUTPATIENT

## 2023-10-24 RX ORDER — FAMOTIDINE 40 MG/1
1 TABLET AT BEDTIME TABLET, FILM COATED ORAL ONCE A DAY
Qty: 30 | Status: ACTIVE | COMMUNITY

## 2023-10-24 RX ORDER — ALPRAZOLAM 0.5 MG/1
AS DIRECTED TABLET ORAL
Status: ON HOLD | COMMUNITY

## 2023-10-24 RX ORDER — PANTOPRAZOLE SODIUM 40 MG/1
1 TABLET TABLET, DELAYED RELEASE ORAL
Qty: 180 TABLET | Refills: 3

## 2023-10-24 RX ORDER — BISOPROLOL FUMARATE 10 MG/1
1 TABLET TABLET, FILM COATED ORAL
Refills: 0 | Status: ACTIVE | COMMUNITY
Start: 1900-01-01

## 2023-10-24 RX ORDER — WHEAT DEXTRIN 3 G/3.5 G
AS DIRECTED POWDER (GRAM) ORAL
Status: ACTIVE | COMMUNITY

## 2023-10-24 RX ORDER — PANTOPRAZOLE SODIUM 40 MG/1
TAKE ONE TABLET BY MOUTH ONE TIME DAILY TABLET, DELAYED RELEASE ORAL ONCE A DAY
Qty: 90 | Refills: 3 | Status: ACTIVE | COMMUNITY

## 2023-10-24 RX ORDER — VITAMIN E MIXED 400 UNIT
AS DIRECTED CAPSULE ORAL
Status: ACTIVE | COMMUNITY

## 2023-10-24 RX ORDER — WHEAT DEXTRIN 3 G/3.5 G
AS DIRECTED POWDER (GRAM) ORAL
OUTPATIENT

## 2023-10-24 NOTE — HPI-TODAY'S VISIT:
f/u visit S/p dilation up to 16.5mm at [facility] 5/2023 Did well until Sept with return of dysphagia Recent labs with Dr. Luke, and awaiting results   . PRIOR VISIT: Recently at [facility] 3/9/23 with food impaciton, which occurred after dilation to 12mm 2/2023 Since then has done well, but occ sticking Needs repeat EGD for dilation  . PRIOR VISIT: Recently hospitalized at [facility] for initiation of dialysis In clinic today for months of epigastric abd discomfort after eating He will have sharp epigastric pain after eating or drinking.  Intermittent symptoms Will feel like food gets stuck from time to time     HOSPITAL CONSULT 1/30/23 ROCKY GROVES is a 62 y.o. male with a PMH of CKD, anxiety, depression, and alcohol use who presented on 1/29 with general malaise, poor oral intake, and reported fall at home, found to have electrolyte abnormalities, JO, elevated liver enzymes, and rhabdomyolysis, admitted to the ICU for further management. GI is consulted on 1/30 for dark stools.  . PRIOR VISIT: Patient referred by Dr. Luke for evaluation of anemia.  A copy of this report will be sent to referring provider. Recent labs with PCP revealed mild macrocytic anemia.   MCV>100.  On review of my old labs, his ferritin was >1000  12/2020.  He does endorese h/o drinking 2 bottles of wine a day.  Does have h/o mild AST/ALT elevaiton with normal LFTs 12/2019.  MRI in the past with heterogenous liver.  Recent MRI done heterogenous liver without nodular contour to suggest fibrosis.   He saw Dr. Dale recently for evaluation, but records are not currently available (I do have Dr. Dale's clinic note that reports Plts >300).  No change in bowel habits.   We reviewed EGD/Colonoscopy reports from 2019 without significant pathology.  . PRIOR VISIT: Rectal pain reported last visit seems to have resolved with compound ointment Has not started fiber supplement No change in bowel habits Continues driking 2-3 glasses of wine a day PRIOR VISIT: At last visit, we discussed f/u hemorrhoids Reports he has tried to increase H20, but reports he is also drinking gatorade as well. Did not start fiber supplement. Bleeding has improved. Now reports significant rectal pain with bowel movements. 3-4 BM/day. No straining. Recent MRI reviewed. Heterogenous liver noted. Mild AST/ALT elevation on labs Still having 2-3 drinks/day  . PRIOR VISIT: H/o GERD, which is well controlled on pantoprazole He has a h/o bleeding hemorrhoids. Last colonoscopy 2019 with TA rec repeat 3yrs. He sees blood at least 2x/week with bowel movements Reports he has 2-3 BM/day. BM usually soft. No significant straining. Does sit on the toilet for 5-8min for BMs Does not drink enough water during the day Does not get enough fiber in his diet Has been on Canasa in the past with mild benefit Has had hemorrhoid banding about 3 years ago Per report he had an enlarged liver on a recent ultrasound He thinks some of his liver enzymes were abnormal as well, but not sure what numbers are He drinks a "couple glasses" of wine every day. Denies other EtOH intake No known h/o liver disease  . PRIOR VISIT: GERD with stable symtpoms on Pantoprazole but was better on Dexilant. Canasa helps somewhat. Still bleeding. Wamnts some donnatal Reports abdominal; apin, hemorrhoids, bloating, change in bowel habits, diarrhea, indigestion, mucous in the stool and rectal bleeding. His last EGD 7/14/16 was without Vizcarra's or ulcers. Last E/C 7/11/14 was with RE and Adenoma. Right nipple pain and fullness. Has had night sweats. We discussed this for 45 minutes. 50% of time was spent coordinantg care.

## 2023-11-02 ENCOUNTER — OFFICE VISIT (OUTPATIENT)
Dept: URBAN - METROPOLITAN AREA MEDICAL CENTER 39 | Facility: MEDICAL CENTER | Age: 63
End: 2023-11-02
Payer: COMMERCIAL

## 2023-11-02 DIAGNOSIS — K22.2 ACQUIRED ESOPHAGEAL RING: ICD-10-CM

## 2023-11-02 DIAGNOSIS — K29.60 ADENOPAPILLOMATOSIS GASTRICA: ICD-10-CM

## 2023-11-02 PROCEDURE — 43249 ESOPH EGD DILATION <30 MM: CPT | Performed by: INTERNAL MEDICINE

## 2023-11-02 RX ORDER — ROSUVASTATIN CALCIUM 40 MG/1
1 TABLET TABLET, FILM COATED ORAL
Status: ACTIVE | COMMUNITY

## 2023-11-02 RX ORDER — VITAMIN E MIXED 400 UNIT
AS DIRECTED CAPSULE ORAL
Status: ACTIVE | COMMUNITY

## 2023-11-02 RX ORDER — BISOPROLOL FUMARATE 10 MG/1
1 TABLET TABLET, FILM COATED ORAL
Refills: 0 | Status: ACTIVE | COMMUNITY
Start: 1900-01-01

## 2023-11-02 RX ORDER — ESCITALOPRAM 20 MG/1
1 TABLET TABLET, FILM COATED ORAL
Refills: 0 | Status: ACTIVE | COMMUNITY
Start: 1900-01-01

## 2023-11-02 RX ORDER — WHEAT DEXTRIN 3 G/3.5 G
AS DIRECTED POWDER (GRAM) ORAL
Status: ACTIVE | COMMUNITY

## 2023-11-02 RX ORDER — PANTOPRAZOLE SODIUM 40 MG/1
TAKE ONE TABLET BY MOUTH ONE TIME DAILY TABLET, DELAYED RELEASE ORAL ONCE A DAY
Qty: 90 | Refills: 3 | Status: ACTIVE | COMMUNITY

## 2023-11-02 RX ORDER — FAMOTIDINE 40 MG/1
1 TABLET AT BEDTIME TABLET, FILM COATED ORAL ONCE A DAY
Qty: 90 | Refills: 3 | Status: ACTIVE | COMMUNITY

## 2023-11-02 RX ORDER — ALPRAZOLAM 0.5 MG/1
AS DIRECTED TABLET ORAL
Status: ON HOLD | COMMUNITY

## 2023-11-02 RX ORDER — PANTOPRAZOLE SODIUM 40 MG/1
1 TABLET TABLET, DELAYED RELEASE ORAL
Qty: 180 TABLET | Refills: 3 | Status: ACTIVE | COMMUNITY

## 2023-11-28 ENCOUNTER — P2P PATIENT RECORD (OUTPATIENT)
Age: 63
End: 2023-11-28

## 2024-08-30 ENCOUNTER — TELEPHONE ENCOUNTER (OUTPATIENT)
Dept: URBAN - METROPOLITAN AREA CLINIC 98 | Facility: CLINIC | Age: 64
End: 2024-08-30

## 2024-09-03 ENCOUNTER — OFFICE VISIT (OUTPATIENT)
Dept: URBAN - METROPOLITAN AREA CLINIC 98 | Facility: CLINIC | Age: 64
End: 2024-09-03

## 2024-09-03 VITALS
HEIGHT: 68 IN | TEMPERATURE: 97.36 F | WEIGHT: 191 LBS | HEART RATE: 70 BPM | DIASTOLIC BLOOD PRESSURE: 69 MMHG | BODY MASS INDEX: 28.95 KG/M2 | SYSTOLIC BLOOD PRESSURE: 143 MMHG

## 2024-09-03 RX ORDER — ESCITALOPRAM 20 MG/1
1 TABLET TABLET, FILM COATED ORAL
Refills: 0 | Status: ACTIVE | COMMUNITY
Start: 1900-01-01

## 2024-09-03 RX ORDER — BISOPROLOL FUMARATE 10 MG/1
1 TABLET TABLET, FILM COATED ORAL
Refills: 0 | Status: ACTIVE | COMMUNITY
Start: 1900-01-01

## 2024-09-03 RX ORDER — FAMOTIDINE 40 MG/1
1 TABLET AT BEDTIME TABLET, FILM COATED ORAL ONCE A DAY
Qty: 90 | Refills: 3 | Status: ACTIVE | COMMUNITY

## 2024-09-03 RX ORDER — ROSUVASTATIN 40 MG/1
1 TABLET TABLET, FILM COATED ORAL
Status: ACTIVE | COMMUNITY

## 2024-09-03 RX ORDER — WHEAT DEXTRIN 3 G/3.5 G
AS DIRECTED POWDER (GRAM) ORAL
Status: ACTIVE | COMMUNITY

## 2024-09-03 RX ORDER — VITAMIN E MIXED 400 UNIT
AS DIRECTED CAPSULE ORAL
Status: ACTIVE | COMMUNITY

## 2024-09-03 RX ORDER — PANTOPRAZOLE SODIUM 40 MG/1
1 TABLET TABLET, DELAYED RELEASE ORAL
Qty: 180 TABLET | Refills: 3 | Status: ACTIVE | COMMUNITY

## 2024-09-03 RX ORDER — ALPRAZOLAM 0.5 MG/1
AS DIRECTED TABLET ORAL
Status: ON HOLD | COMMUNITY

## 2024-09-03 RX ORDER — PANTOPRAZOLE SODIUM 40 MG/1
TAKE ONE TABLET BY MOUTH ONE TIME DAILY TABLET, DELAYED RELEASE ORAL ONCE A DAY
Qty: 90 | Refills: 3 | Status: ACTIVE | COMMUNITY

## 2024-09-03 NOTE — HPI-TODAY'S VISIT:
f/u visit 10/24/23- Dr. Mendel S/p dilation up to 16.5mm at [facility] 5/2023 Did well until Sept with return of dysphagia Recent labs with Dr. Luke, and awaiting results   . PRIOR VISIT: Recently at [facility] 3/9/23 with food impaciton, which occurred after dilation to 12mm 2/2023 Since then has done well, but occ sticking Needs repeat EGD for dilation  . PRIOR VISIT: Recently hospitalized at [facility] for initiation of dialysis In clinic today for months of epigastric abd discomfort after eating He will have sharp epigastric pain after eating or drinking.  Intermittent symptoms Will feel like food gets stuck from time to time     HOSPITAL CONSULT 1/30/23 ROCKY GROVES is a 62 y.o. male with a PMH of CKD, anxiety, depression, and alcohol use who presented on 1/29 with general malaise, poor oral intake, and reported fall at home, found to have electrolyte abnormalities, JO, elevated liver enzymes, and rhabdomyolysis, admitted to the ICU for further management. GI is consulted on 1/30 for dark stools.  . PRIOR VISIT: Patient referred by Dr. Luke for evaluation of anemia.  A copy of this report will be sent to referring provider. Recent labs with PCP revealed mild macrocytic anemia.   MCV>100.  On review of my old labs, his ferritin was >1000  12/2020.  He does endorese h/o drinking 2 bottles of wine a day.  Does have h/o mild AST/ALT elevaiton with normal LFTs 12/2019.  MRI in the past with heterogenous liver.  Recent MRI done heterogenous liver without nodular contour to suggest fibrosis.   He saw Dr. Dale recently for evaluation, but records are not currently available (I do have Dr. Dale's clinic note that reports Plts >300).  No change in bowel habits.   We reviewed EGD/Colonoscopy reports from 2019 without significant pathology.  . PRIOR VISIT: Rectal pain reported last visit seems to have resolved with compound ointment Has not started fiber supplement No change in bowel habits Continues driking 2-3 glasses of wine a day PRIOR VISIT: At last visit, we discussed f/u hemorrhoids Reports he has tried to increase H20, but reports he is also drinking gatorade as well. Did not start fiber supplement. Bleeding has improved. Now reports significant rectal pain with bowel movements. 3-4 BM/day. No straining. Recent MRI reviewed. Heterogenous liver noted. Mild AST/ALT elevation on labs Still having 2-3 drinks/day  . PRIOR VISIT: H/o GERD, which is well controlled on pantoprazole He has a h/o bleeding hemorrhoids. Last colonoscopy 2019 with TA, rec repeat 3yrs. He sees blood at least 2x/week with bowel movements Reports he has 2-3 BM/day. BM usually soft. No significant straining. Does sit on the toilet for 5-8min for BMs Does not drink enough water during the day Does not get enough fiber in his diet Has been on Canasa in the past with mild benefit Has had hemorrhoid banding about 3 years ago Per report he had an enlarged liver on a recent ultrasound He thinks some of his liver enzymes were abnormal as well, but not sure what numbers are He drinks a "couple glasses" of wine every day. Denies other EtOH intake No known h/o liver disease  . PRIOR VISIT: GERD with stable symtpoms on Pantoprazole but was better on Dexilant. Canasa helps somewhat. Still bleeding. Wamnts some donnatal Reports abdominal; apin, hemorrhoids, bloating, change in bowel habits, diarrhea, indigestion, mucous in the stool and rectal bleeding. His last EGD 7/14/16 was without Vizcarra's or ulcers. Last E/C 7/11/14 was with RE and Adenoma. Right nipple pain and fullness. Has had night sweats. We discussed this for 45 minutes. 50% of time was spent coordinantg care.

## 2024-11-17 NOTE — PHYSICAL EXAM GASTROINTESTINAL
ALYSIA is likely due to pre-renal azotemia due to dehydration. Baseline creatinine is  1.2-1.3 . Most recent creatinine and eGFR are listed below.  Recent Labs     11/15/24  0419 11/16/24  0452   CREATININE 2.3* 2.1*   EGFRNORACEVR 29.4* 32.8*        Plan  - Avoid nephrotoxins and renally dose meds for GFR listed above  - Monitor urine output, serial BMP, and adjust therapy as needed  - Patient is in need of IV fluids for the treatment of ALYSIA.  Due to a shortage of IV fluids, patient to receive 1L LR bolus.  Patient must receive this treatment in a hospital location due to the risk of adverse effects, insufficient response to treatment, or other potential complications of disease such as kidney failure.    Self Exam: Abdomen soft, non-tender to palpatation, non-distended

## 2025-05-09 ENCOUNTER — TELEPHONE ENCOUNTER (OUTPATIENT)
Dept: URBAN - METROPOLITAN AREA CLINIC 95 | Facility: CLINIC | Age: 65
End: 2025-05-09

## 2025-05-09 RX ORDER — FAMOTIDINE 40 MG/1
1 TABLET AT BEDTIME TABLET, FILM COATED ORAL ONCE A DAY
Qty: 90 | Refills: 3